# Patient Record
Sex: MALE | Race: WHITE | URBAN - METROPOLITAN AREA
[De-identification: names, ages, dates, MRNs, and addresses within clinical notes are randomized per-mention and may not be internally consistent; named-entity substitution may affect disease eponyms.]

---

## 2019-07-09 ASSESSMENT — ANXIETY QUESTIONNAIRES
7. FEELING AFRAID AS IF SOMETHING AWFUL MIGHT HAPPEN: NOT AT ALL
7. FEELING AFRAID AS IF SOMETHING AWFUL MIGHT HAPPEN: NOT AT ALL
GAD7 TOTAL SCORE: 0
2. NOT BEING ABLE TO STOP OR CONTROL WORRYING: NOT AT ALL
7. FEELING AFRAID AS IF SOMETHING AWFUL MIGHT HAPPEN: NOT AT ALL
2. NOT BEING ABLE TO STOP OR CONTROL WORRYING: NOT AT ALL
4. TROUBLE RELAXING: NOT AT ALL
GAD7 TOTAL SCORE: 0
GAD7 TOTAL SCORE: 0
6. BECOMING EASILY ANNOYED OR IRRITABLE: NOT AT ALL
1. FEELING NERVOUS, ANXIOUS, OR ON EDGE: NOT AT ALL
5. BEING SO RESTLESS THAT IT IS HARD TO SIT STILL: NOT AT ALL
5. BEING SO RESTLESS THAT IT IS HARD TO SIT STILL: NOT AT ALL
GAD7 TOTAL SCORE: 0
3. WORRYING TOO MUCH ABOUT DIFFERENT THINGS: NOT AT ALL
GAD7 TOTAL SCORE: 0
6. BECOMING EASILY ANNOYED OR IRRITABLE: NOT AT ALL
GAD7 TOTAL SCORE: 0
7. FEELING AFRAID AS IF SOMETHING AWFUL MIGHT HAPPEN: NOT AT ALL
3. WORRYING TOO MUCH ABOUT DIFFERENT THINGS: NOT AT ALL
4. TROUBLE RELAXING: NOT AT ALL
1. FEELING NERVOUS, ANXIOUS, OR ON EDGE: NOT AT ALL

## 2019-07-09 ASSESSMENT — PATIENT HEALTH QUESTIONNAIRE - PHQ9
SUM OF ALL RESPONSES TO PHQ QUESTIONS 1-9: 0

## 2019-07-10 ENCOUNTER — VIRTUAL VISIT (OUTPATIENT)
Dept: INTERNAL MEDICINE | Facility: CLINIC | Age: 49
End: 2019-07-10
Payer: COMMERCIAL

## 2019-07-10 DIAGNOSIS — Z00.00 ENCOUNTER FOR PREVENTIVE HEALTH EXAMINATION: Primary | ICD-10-CM

## 2019-07-10 RX ORDER — ZOLPIDEM TARTRATE 5 MG/1
1 TABLET ORAL PRN
COMMUNITY
Start: 2017-09-28

## 2019-07-10 RX ORDER — DIPHENOXYLATE HYDROCHLORIDE AND ATROPINE SULFATE 2.5; .025 MG/1; MG/1
1 TABLET ORAL DAILY
COMMUNITY
Start: 2016-08-11

## 2019-07-10 RX ORDER — POLYETHYLENE GLYCOL 3350 17 G/17G
1 POWDER, FOR SOLUTION ORAL PRN
COMMUNITY

## 2019-07-10 ASSESSMENT — PATIENT HEALTH QUESTIONNAIRE - PHQ9
SUM OF ALL RESPONSES TO PHQ QUESTIONS 1-9: 0
SUM OF ALL RESPONSES TO PHQ QUESTIONS 1-9: 0

## 2019-07-10 ASSESSMENT — ANXIETY QUESTIONNAIRES
GAD7 TOTAL SCORE: 0
GAD7 TOTAL SCORE: 0

## 2019-07-10 NOTE — PROGRESS NOTES
Health Maintenance:  Do you have a PCP? No  When was your last visit with your PCP?   When was your last eye exam? 1.5 years  Have you ever had a colonoscopy? No  If yes, when?   Have you ever had any polyps removed? No    30+ Pack Year Smoking History:  Have you ever had a chest CT to screen for cancer? No    If Male:  (65 years old+ and tobacco use) Have you ever completed an ultrasound of your abdominal aorta? No    As part of your visit we will set up a DEXA scan which will measure your body composition. We have a few questions that need to be answered before we can schedule this scan:   What is your approximate weight? 215   Have you ever had a DEXA scan within the past 2 years? No   Will you have any other imaging studies with contrast (x-ray, CT scan) within 7  days of this appointment? No   Have you had any spine or hip surgery? No   Do you take any vitamins that contain calcium or antacids with calcium? Yes    If yes, stop taking 24 hours prior to visit.     Goals for the Visit:  1. Thorough Comprehensive Preventative Exam  2. Derm: Mole check    Pertinent past Medical/Family and Social HX: Father had prostate cancer  Pertinent sx that desire are addressed with this visit:       Answers for HPI/ROS submitted by the patient on 7/9/2019   PHQ9 TOTAL SCORE: 0  JOSI 7 TOTAL SCORE: 0  General Symptoms: No  Skin Symptoms: No  HENT Symptoms: No  EYE SYMPTOMS: No  HEART SYMPTOMS: No  LUNG SYMPTOMS: No  INTESTINAL SYMPTOMS: No  URINARY SYMPTOMS: No  REPRODUCTIVE SYMPTOMS: No  SKELETAL SYMPTOMS: No  BLOOD SYMPTOMS: No  NERVOUS SYSTEM SYMPTOMS: No  MENTAL HEALTH SYMPTOMS: No    Instructions prior to appointment:   1. Fast beginning at 10 pm for lab appointment  2. If your preventive care assessment package includes a Fitness Assessment, please bring athletic shoes. Complementary [facility] fitness attire is provided and yours to keep.  3. If eye exam, eyes may be dilated, it will last 4-6 hours, may  want to bring sunglasses.   4. May bring laptop or other work materials for use during downtime.   5. You will receive an email about 3 days prior to your visit with a final itinerary, menu selections for the complementary breakfast and lunch and instructions for the visit.     Complimentary  Parking provided. Drop off car in front of [facility], take the patient elevators to the [facility]. When you enter in the lobby, identify yourself as an [facility] [atient and you will be escorted up to the clinic.   If questions arise prior to your appointment please contact the clinic at [phone].

## 2019-07-15 ENCOUNTER — APPOINTMENT (OUTPATIENT)
Dept: INTERNAL MEDICINE | Facility: CLINIC | Age: 49
End: 2019-07-15
Payer: COMMERCIAL

## 2019-07-15 ENCOUNTER — ANCILLARY PROCEDURE (OUTPATIENT)
Dept: BONE DENSITY | Facility: CLINIC | Age: 49
End: 2019-07-15
Payer: COMMERCIAL

## 2019-07-15 ENCOUNTER — OFFICE VISIT (OUTPATIENT)
Dept: OPHTHALMOLOGY | Facility: CLINIC | Age: 49
End: 2019-07-15
Payer: COMMERCIAL

## 2019-07-15 ENCOUNTER — OFFICE VISIT (OUTPATIENT)
Dept: INTERNAL MEDICINE | Facility: CLINIC | Age: 49
End: 2019-07-15
Payer: COMMERCIAL

## 2019-07-15 ENCOUNTER — OFFICE VISIT (OUTPATIENT)
Dept: AUDIOLOGY | Facility: CLINIC | Age: 49
End: 2019-07-15
Payer: COMMERCIAL

## 2019-07-15 ENCOUNTER — OFFICE VISIT (OUTPATIENT)
Dept: DERMATOLOGY | Facility: CLINIC | Age: 49
End: 2019-07-15
Payer: COMMERCIAL

## 2019-07-15 VITALS
HEART RATE: 64 BPM | WEIGHT: 216 LBS | OXYGEN SATURATION: 99 % | TEMPERATURE: 97.6 F | DIASTOLIC BLOOD PRESSURE: 79 MMHG | RESPIRATION RATE: 16 BRPM | SYSTOLIC BLOOD PRESSURE: 113 MMHG | HEIGHT: 73 IN | BODY MASS INDEX: 28.63 KG/M2

## 2019-07-15 DIAGNOSIS — Z00.00 ROUTINE GENERAL MEDICAL EXAMINATION AT A HEALTH CARE FACILITY: Primary | ICD-10-CM

## 2019-07-15 DIAGNOSIS — H90.3 ASYMMETRICAL SENSORINEURAL HEARING LOSS: ICD-10-CM

## 2019-07-15 DIAGNOSIS — Z00.00 ENCOUNTER FOR PREVENTIVE HEALTH EXAMINATION: Primary | ICD-10-CM

## 2019-07-15 DIAGNOSIS — D18.01 CHERRY ANGIOMA: Primary | ICD-10-CM

## 2019-07-15 DIAGNOSIS — Z87.19 HISTORY OF RECTAL BLEEDING: ICD-10-CM

## 2019-07-15 DIAGNOSIS — H52.10: Primary | ICD-10-CM

## 2019-07-15 DIAGNOSIS — H90.3 SENSORINEURAL HEARING LOSS, ASYMMETRICAL: Primary | ICD-10-CM

## 2019-07-15 DIAGNOSIS — Z90.89 STATUS POST TONSILLECTOMY: ICD-10-CM

## 2019-07-15 DIAGNOSIS — Z00.00 ENCOUNTER FOR PREVENTIVE HEALTH EXAMINATION: ICD-10-CM

## 2019-07-15 DIAGNOSIS — Z87.448 HISTORY OF HEMATURIA: ICD-10-CM

## 2019-07-15 DIAGNOSIS — D22.5 MELANOCYTIC NEVUS OF TRUNK: ICD-10-CM

## 2019-07-15 DIAGNOSIS — Z86.39 HISTORY OF VITAMIN D DEFICIENCY: ICD-10-CM

## 2019-07-15 DIAGNOSIS — Z87.81 HISTORY OF WRIST FRACTURE: ICD-10-CM

## 2019-07-15 LAB
ALBUMIN UR-MCNC: NEGATIVE MG/DL
ALP SERPL-CCNC: 76 U/L (ref 40–150)
ALT SERPL W P-5'-P-CCNC: 29 U/L (ref 0–70)
APPEARANCE UR: CLEAR
BASOPHILS # BLD AUTO: 0.1 10E9/L (ref 0–0.2)
BASOPHILS NFR BLD AUTO: 1 %
BILIRUB UR QL STRIP: NEGATIVE
CHOLEST SERPL-MCNC: 169 MG/DL
COLOR UR AUTO: YELLOW
CREAT SERPL-MCNC: 1.11 MG/DL (ref 0.66–1.25)
DIFFERENTIAL METHOD BLD: NORMAL
EOSINOPHIL # BLD AUTO: 0.1 10E9/L (ref 0–0.7)
EOSINOPHIL NFR BLD AUTO: 1 %
ERYTHROCYTE [DISTWIDTH] IN BLOOD BY AUTOMATED COUNT: 12.3 % (ref 10–15)
GFR SERPL CREATININE-BSD FRML MDRD: 77 ML/MIN/{1.73_M2}
GLUCOSE SERPL-MCNC: 89 MG/DL (ref 70–99)
GLUCOSE UR STRIP-MCNC: NEGATIVE MG/DL
HCT VFR BLD AUTO: 45.6 % (ref 40–53)
HDLC SERPL-MCNC: 55 MG/DL
HGB BLD-MCNC: 14.6 G/DL (ref 13.3–17.7)
HGB UR QL STRIP: ABNORMAL
KETONES UR STRIP-MCNC: NEGATIVE MG/DL
LDLC SERPL CALC-MCNC: 96 MG/DL
LEUKOCYTE ESTERASE UR QL STRIP: NEGATIVE
LYMPHOCYTES # BLD AUTO: 2.2 10E9/L (ref 0.8–5.3)
LYMPHOCYTES NFR BLD AUTO: 42 %
MCH RBC QN AUTO: 30.3 PG (ref 26.5–33)
MCHC RBC AUTO-ENTMCNC: 32 G/DL (ref 31.5–36.5)
MCV RBC AUTO: 95 FL (ref 78–100)
MONOCYTES # BLD AUTO: 0.5 10E9/L (ref 0–1.3)
MONOCYTES NFR BLD AUTO: 9 %
NEUTROPHILS # BLD AUTO: 2.4 10E9/L (ref 1.6–8.3)
NEUTROPHILS NFR BLD AUTO: 47 %
NITRATE UR QL: NEGATIVE
NONHDLC SERPL-MCNC: 114 MG/DL
PH UR STRIP: 5 PH (ref 5–7)
PLATELET # BLD AUTO: 221 10E9/L (ref 150–450)
PSA SERPL-ACNC: 0.6 UG/L (ref 0–4)
RBC # BLD AUTO: 4.82 10E12/L (ref 4.4–5.9)
RBC #/AREA URNS AUTO: <1 /HPF (ref 0–2)
SOURCE: ABNORMAL
SP GR UR STRIP: 1.02 (ref 1–1.03)
TRIGL SERPL-MCNC: 89 MG/DL
TSH SERPL DL<=0.005 MIU/L-ACNC: 1.04 MU/L (ref 0.4–4)
UROBILINOGEN UR STRIP-MCNC: 0 MG/DL (ref 0–2)
WBC # BLD AUTO: 5.3 10E9/L (ref 4–11)
WBC #/AREA URNS AUTO: <1 /HPF (ref 0–5)

## 2019-07-15 ASSESSMENT — REFRACTION_MANIFEST
OD_CYLINDER: +0.25
OD_ADD: +1.50
OS_CYLINDER: +0.50
OS_AXIS: 140
OS_ADD: +1.50
OD_AXIS: 014
OD_SPHERE: -1.00
OS_SPHERE: -1.00

## 2019-07-15 ASSESSMENT — CONF VISUAL FIELD
METHOD: COUNTING FINGERS
OD_NORMAL: 1
OS_NORMAL: 1

## 2019-07-15 ASSESSMENT — TONOMETRY
IOP_METHOD: ICARE
OD_IOP_MMHG: 9
OS_IOP_MMHG: 8

## 2019-07-15 ASSESSMENT — VISUAL ACUITY
METHOD: SNELLEN - LINEAR
OS_SC: 20/30
OD_SC: 20/25
OS_SC+: -1

## 2019-07-15 ASSESSMENT — EXTERNAL EXAM - LEFT EYE: OS_EXAM: NORMAL

## 2019-07-15 ASSESSMENT — CUP TO DISC RATIO
OD_RATIO: 0.2
OS_RATIO: 0.2

## 2019-07-15 ASSESSMENT — MIFFLIN-ST. JEOR: SCORE: 1907.61

## 2019-07-15 ASSESSMENT — SLIT LAMP EXAM - LIDS
COMMENTS: NORMAL
COMMENTS: NORMAL

## 2019-07-15 ASSESSMENT — PAIN SCALES - GENERAL
PAINLEVEL: NO PAIN (0)
PAINLEVEL: NO PAIN (0)

## 2019-07-15 ASSESSMENT — EXTERNAL EXAM - RIGHT EYE: OD_EXAM: NORMAL

## 2019-07-15 NOTE — LETTER
"7/15/2019       RE: Zhang Fajardo  [address]  [place] [address]     Dear Colleague,    Thank you for referring your patient, Zhang Fajardo, to the [facility] at [facility]. Please see a copy of my visit note below.     History and Physical Examination     SUBJECTIVE: Chief complaint: preventive health review.     Past Medical History:  1.  Status post tonsillectomy  2.  Status post excision of unspecified \"precancerous\" skin lesion, right axilla  3.  Status post bilateral traumatic wrist fractures, circa 1980  4.  History of microscopic hematuria with 2016 unremarkable evaluation including CT urogram  5.  History of atrophic right kidney, left kidney hypertrophy, and \"irregular\" bladder, deemed benign and without need for further evaluation based on 9/2017 CT  6.  History of right upper chest \"scarring\", on CT to evaluate possible nodules, 9/2017  7.  History of rectal bleeding; colonoscopy recommended but not completed following identification of hemorrhoids  8.  History of vitamin D deficiency     Adverse Drug Reactions: Amoxicillin (urticaria)     Current Medications:  Daily multivitamin supplement  Zolpidem, 5 g daily at bedtime as needed for international travel-related insomnia  Polyethylene glycol, backspace (MiraLAX), 17 gm daily as needed; used infrequently     Habits:  Tobacco: Never  Alcohol: Never  Caffeine: 2 servings of coffee per day     Social History:  to Patience and father 3 daughters: Linda, age 18, who will begin her freshman year at [employer] this fall; eZna, age 16, who will begin her ty year of high school this fall; and Jennifer, age 12, who will begin seventh grade this fall.  Jose is a native of [place], [place] who received his undergraduate and HARISH degrees from the [employer].  He has served as the  of [employer] over the past year, having previously worked for many years at " [employer].  Away from work, he enjoys family activities, downhill skiing at [place], and golf.     Family History: Mother is 73, with history of dyslipidemia.  Father is 75, with history prostate cancer, diagnosed at age 70.  A sister 2 years his senior is overweight.  A sister 9 years his ty is in good health.  Daughters are in good health.  Maternal grandfather  in his mid 60s after suffering a myocardial infarction at age 60.  Maternal grandmother  in her mid 60s, with history of depression and possible heart disease.  Paternal grandfather  from cancer, possibly stomach, in his mid-80s.  Paternal grandmother was a nonsmoker who  from  lung cancer at age 70.     Review of Systems: No history of colonoscopy.  Most recent tetanus (Tdap) was administered in 2013.  Hepatitis A and B vaccination series completed in 2013.  M MR administered in 2014.  Typhoid IM administered in 2018.  Remainder of complete review of systems was negative.     OBJECTIVE:     Vital signs: Reviewed in patient health profile.  General: Alert, neatly dressed and groomed, in no acute distress.  HEENT: Atraumatic and normocephalic. Eyelids, pupils, and conjunctivae appeared normal. Lips, teeth and gums appear normal.  Oropharynx showed moist mucous membranes, without exudate or erythema.  Neck: Supple, without thyromegaly, mass, or bruit. No cervical or supraclavicular lymphadenopathy.  Back: No spinal or costovertebral angle tenderness.  Chest: Clear to auscultation and percussion. Normal respiratory effort.  Cardiovascular: No jugular venous distention. Regular rate and rhythm, normal S1, S2 without murmur.  Abdomen: Bowel sounds positive; soft, nontender, without rebound, guarding, hepatosplenomegaly or mass.  Extremities: No cyanosis or edema.  Genitalia: Normal male genitalia, without scrotal mass or hernia. No inguinal lymphadenopathy.  Rectal: Normal tone, with smooth, nontender, nonenlarged  "prostate. No rectal mass.  Skin: Examination was deferred; full evaluation was completed earlier in day through dermatology clinic.  Neurologic: Cranial nerves II-XII were grossly intact. Sensory and motor examinations were normal. Normal gait.  Mini-Cog score was 4/5; 5/5 with minimal prompting.  Psychiatric: Alert and oriented ×3. Normal affect. Judgment and insight intact.  JOSI-7 and PHQ-9 scores were 0.    Creatinine 1.71, alkaline phosphatase 76, ALT 29, cholesterol 169, HDL 55, LDL 96, triglycerides 89, cholesterol/HDL 3.0, PSA 0.60, TSH 1.04, glucose 89, white blood cell count 5300, hemoglobin 14.6, platelets 221,000, urinalysis notable for both \"small\" blood, with <1 rbc/hpf.  HIV and 25-hydroxy vitamin D levels pending.    EKG was unremarkable.  Spirometry showed an FEV1 of 5.00, with an FVC of 5.82; readings were 114% and 104% of predicted values, respectively.    DEXA showed normal bone density, with most negative and valid Z-score at L1-L4 composite.  Body composition analysis showed 38.4% fat (100th percentile); body mass index was 28.5.     ASSESSMENT:    1.  History of rectal bleeding.  No active symptoms.  I recommended colonoscopy for further evaluation.    2.  Increased body fat composition.  Guidelines from the AHA/ACC estimates his 10-year risk of a vascular event at 1.6% (optimal for his age/gender cohort is 1.7%).  Nevertheless, given his 100th percentile reading of 38.4% fat, I recommended weight loss, with additional strength training to build muscle mass.  We reviewed strategies to facilitate weight loss and a plan for integrating additional physical activity and exercise into his busy schedule.    3.  Asymmetric hearing loss.  ENT consultation recommended for evaluation of 25 dB high-frequency hearing loss discrepancy, as advised by audiologist.    4.  Preventive care.  We reviewed the debate regarding the benefit of multivitamin supplements and the importance of selecting a product that " "does not contain iron should he choose to continue.  I recommended daily use of a 2000-international unit vitamin D3 supplement, while maintaining daily calcium intake of 1000 mg, preferably from dietary sources.  We reviewed strategies to reduce weight, including the importance of \"mindful\", slow eating, use of small plates, adequate hydration, appealing food presentation, and healthful snacking.  Immunization status is up-to-date.    PLAN: See above.     ~SRT      Again, thank you for allowing me to participate in the care of your patient.      Sincerely,    Quinten Gill MD      "

## 2019-07-15 NOTE — PROGRESS NOTES
Zhang JACQUES Fajardo comes into clinic today at the request of Dr. MARCELA Gill Ordering Provider for EKG.    This service provided today was under the supervising provider of the day Dr. MARCELA Gill, who was available if needed.    Tegan Hardin

## 2019-07-15 NOTE — PROGRESS NOTES
[facility] Dermatology Note      Dermatology Problem List:    Specialty Problems     None          CC:   Skin Check (Javier is here for skin check mole on his back )        Encounter Date: Jul 15, 2019    History of Present Illness:  Mr. Zhang Fajardo is a 49 year old male who presents as a referral from Self.    Past Medical History:   There is no problem list on file for this patient.    No past medical history on file.    Allergy History:     Allergies   Allergen Reactions     Amoxicillin      PN: LW Reaction: HIVES       Social History:     reports that he has never smoked. He has never used smokeless tobacco. He reports that he does not drink alcohol or use drugs.      Family History:  Family History   Problem Relation Age of Onset     Cancer Paternal Grandfather      Cancer Paternal Grandmother         lung     Cancer Father         prostate     Myocardial Infarction Maternal Grandfather      Hyperlipidemia Mother        Medications:  Current Outpatient Medications   Medication Sig Dispense Refill     Multiple Vitamin (MULTI-VITAMINS) TABS Take 1 tablet by mouth daily       polyethylene glycol (MIRALAX/GLYCOLAX) packet Take 1 packet by mouth as needed for constipation       zolpidem (AMBIEN) 5 MG tablet Take 1 tablet by mouth as needed             Review of Systems:  -As per HPI  -Constitutional: The patient denies fatigue, fevers, chills, unintended weight loss, and night sweats.  -HEENT: Patient denies nonhealing oral sores.  -Skin: As above in HPI. No additional skin concerns.    Physical exam:  Vitals: There were no vitals taken for this visit.  GEN: This is a well developed, well-nourished male in no acute distress, in a pleasant mood.    SKIN: Full skin, which includes the head/face, both arms, chest, back, abdomen,both legs, genitalia and/or groin buttocks, digits and/or nails, was examined.  On trunk several cherry angiomas and several light brown, exophytic dermal naevi of about  4mm diameter.   On Dig I left foot a verruca plantaris  No signs for skin cancers    -No other lesions of concern on areas examined.     Impression/Plan:    >> cherry angiomas and dermal naevi    Sun protection and observation      Follow-up in 1-2 years    I spent a total of 10 minutes face to face with Zhang Fajardo during today s office visit. Over 50% of this time was spent counseling the patient and/or coordinating care.  Please see Assessment and Plan for details.

## 2019-07-15 NOTE — Clinical Note
Hi Dr. Gill,This is one of the [facility] health patients today- there is a significant ear difference at one frequency. I spoke with the patient about an ENT appt, or at the VERY least an appt in one year to monitor hearing status.Let me know if you have questions.  Thanks!Gillian Wick

## 2019-07-15 NOTE — NURSING NOTE
Chief Complaints and History of Present Illnesses   Patient presents with     COMPREHENSIVE EYE EXAM     Chief Complaint(s) and History of Present Illness(es)     COMPREHENSIVE EYE EXAM     Laterality: both eyes    Frequency: constantly    Course: gradually worsening    Treatments tried: no treatments    Pain scale: 0/10              Comments     Annual eye exam, last exam 1.5 years ago. Pt feels vision has been getting harder to focus up close but still doing fine. H/o Lasik. No pain. No drops.    Vickie Villareal COT 7:57 AM July 15, 2019

## 2019-07-15 NOTE — LETTER
"7/15/2019      RE: Zhang HANNA Tana  [address] [place] [address]        History and Physical Examination     SUBJECTIVE: Chief complaint: preventive health review.     Past Medical History:  1.  Status post tonsillectomy  2.  Status post excision of unspecified \"precancerous\" skin lesion, right axilla  3.  Status post bilateral traumatic wrist fractures, circa 1980  4.  History of microscopic hematuria with 2016 unremarkable evaluation including CT urogram  5.  History of atrophic right kidney, left kidney hypertrophy, and \"irregular\" bladder, deemed benign and without need for further evaluation based on 9/2017 CT  6.  History of right upper chest \"scarring\", on CT to evaluate possible nodules, 9/2017  7.  History of rectal bleeding; colonoscopy recommended but not completed following identification of hemorrhoids  8.  History of vitamin D deficiency     Adverse Drug Reactions: Amoxicillin (urticaria)     Current Medications:  Daily multivitamin supplement  Zolpidem, 5 g daily at bedtime as needed for international travel-related insomnia  Polyethylene glycol, backspace (MiraLAX), 17 gm daily as needed; used infrequently     Habits:  Tobacco: Never  Alcohol: Never  Caffeine: 2 servings of coffee per day     Social History:  to Patience and father 3 daughters: Linda, age 18, who will begin her freshman year at [employer] this fall; Zena, age 16, who will begin her ty year of high school this fall; and Jennifer, age 12, who will begin seventh grade this fall.  Jose is a native of [place], [place] who received his undergraduate and HARISH degrees from the [employer].  He has served as the  of [employer] over the past year, having previously worked for many years at [employer].  Away from work, he enjoys family activities, downhill skiing at [place], and golf.     Family History: Mother is 73, with history of dyslipidemia.  Father is 75, with history prostate " cancer, diagnosed at age 70.  A sister 2 years his senior is overweight.  A sister 9 years his ty is in good health.  Daughters are in good health.  Maternal grandfather  in his mid 60s after suffering a myocardial infarction at age 60.  Maternal grandmother  in her mid 60s, with history of depression and possible heart disease.  Paternal grandfather  from cancer, possibly stomach, in his mid-80s.  Paternal grandmother was a nonsmoker who  from  lung cancer at age 70.     Review of Systems: No history of colonoscopy.  Most recent tetanus (Tdap) was administered in 2013.  Hepatitis A and B vaccination series completed in 2013.  M MR administered in 2014.  Typhoid IM administered in 2018.  Remainder of complete review of systems was negative.     OBJECTIVE:     Vital signs: Reviewed in patient health profile.  General: Alert, neatly dressed and groomed, in no acute distress.  HEENT: Atraumatic and normocephalic. Eyelids, pupils, and conjunctivae appeared normal. Lips, teeth and gums appear normal.  Oropharynx showed moist mucous membranes, without exudate or erythema.  Neck: Supple, without thyromegaly, mass, or bruit. No cervical or supraclavicular lymphadenopathy.  Back: No spinal or costovertebral angle tenderness.  Chest: Clear to auscultation and percussion. Normal respiratory effort.  Cardiovascular: No jugular venous distention. Regular rate and rhythm, normal S1, S2 without murmur.  Abdomen: Bowel sounds positive; soft, nontender, without rebound, guarding, hepatosplenomegaly or mass.  Extremities: No cyanosis or edema.  Genitalia: Normal male genitalia, without scrotal mass or hernia. No inguinal lymphadenopathy.  Rectal: Normal tone, with smooth, nontender, nonenlarged prostate. No rectal mass.  Skin: Examination was deferred; full evaluation was completed earlier in day through dermatology clinic.  Neurologic: Cranial nerves II-XII were grossly intact. Sensory and motor  "examinations were normal. Normal gait.  Mini-Cog score was 4/5; 5/5 with minimal prompting.  Psychiatric: Alert and oriented ×3. Normal affect. Judgment and insight intact.  JOSI-7 and PHQ-9 scores were 0.    Creatinine 1.71, alkaline phosphatase 76, ALT 29, cholesterol 169, HDL 55, LDL 96, triglycerides 89, cholesterol/HDL 3.0, PSA 0.60, TSH 1.04, glucose 89, white blood cell count 5300, hemoglobin 14.6, platelets 221,000, urinalysis notable for both \"small\" blood, with <1 rbc/hpf.  HIV and 25-hydroxy vitamin D levels pending.    EKG was unremarkable.  Spirometry showed an FEV1 of 5.00, with an FVC of 5.82; readings were 114% and 104% of predicted values, respectively.    DEXA showed normal bone density, with most negative and valid Z-score at L1-L4 composite.  Body composition analysis showed 38.4% fat (100th percentile); body mass index was 28.5.     ASSESSMENT:    1.  History of rectal bleeding.  No active symptoms.  I recommended colonoscopy for further evaluation.    2.  Increased body fat composition.  Guidelines from the AHA/ACC estimates his 10-year risk of a vascular event at 1.6% (optimal for his age/gender cohort is 1.7%).  Nevertheless, given his 100th percentile reading of 38.4% fat, I recommended weight loss, with additional strength training to build muscle mass.  We reviewed strategies to facilitate weight loss and a plan for integrating additional physical activity and exercise into his busy schedule.    3.  Asymmetric hearing loss.  ENT consultation recommended for evaluation of 25 dB high-frequency hearing loss discrepancy, as advised by audiologist.    4.  Preventive care.  We reviewed the debate regarding the benefit of multivitamin supplements and the importance of selecting a product that does not contain iron should he choose to continue.  I recommended daily use of a 2000-international unit vitamin D3 supplement, while maintaining daily calcium intake of 1000 mg, preferably from dietary " "sources.  We reviewed strategies to reduce weight, including the importance of \"mindful\", slow eating, use of small plates, adequate hydration, appealing food presentation, and healthful snacking.  Immunization status is up-to-date.    PLAN: See above.     ~SRT      Quinten Gill MD      "

## 2019-07-15 NOTE — PROGRESS NOTES
AUDIOLOGY REPORT  [facility]    SUMMARY: Audiology visit completed. See audiogram for results.      RECOMMENDATIONS: Follow-up with Dr. Gill. Consider follow-up with ENT regarding the significant ear difference at 8 kHz. Repeat hearing evaluation every 1-2 years, sooner if concerns arise.      Rome Lee  Audiologist  [place] License  #[id number]

## 2019-07-15 NOTE — LETTER
Date:July 16, 2019      Patient was self referred, no letter generated. Do not send.        [facility] Health Information

## 2019-07-15 NOTE — PROGRESS NOTES
Chief Complaints and History of Present Illnesses   Patient presents with     COMPREHENSIVE EYE EXAM     Chief Complaint(s) and History of Present Illness(es)     COMPREHENSIVE EYE EXAM     In both eyes.  Occurring constantly.  Since onset it is gradually   worsening.  Treatments tried include no treatments.  Pain was noted as   0/10.              Comments     Annual eye exam, last exam 1.5 years ago. Pt feels vision has been   getting harder to focus up close but still doing fine. H/o Lasik. No pain.   No drops.    Vickie Villareal COT 7:57 AM July 15, 2019                      Assessment & Plan     Zhang Fajardo is a 49 year old male with the following diagnoses:   1. Error, refractive, myopia, unspecified laterality         Manifest refraction = rX             Attending Physician Attestation:  Complete documentation of historical and exam elements from today's encounter can be found in the full encounter summary report (not reduplicated in this progress note).  I personally obtained the chief complaint(s) and history of present illness.  I confirmed and edited as necessary the review of systems, past medical/surgical history, family history, social history, and examination findings as documented by others; and I examined the patient myself.  I personally reviewed the relevant tests, images, and reports as documented above.  I formulated and edited as necessary the assessment and plan and discussed the findings and management plan with the patient and family. I personally reviewed the ophthalmic test(s) associated with this encounter, agree with the interpretation(s) as documented by the resident/fellow, and have edited the corresponding report(s) as necessary.   -Mahad Desai MD  8:36 AM 7/15/2019

## 2019-07-15 NOTE — PROGRESS NOTES
" History and Physical Examination     SUBJECTIVE: Chief complaint: preventive health review.     Past Medical History:  1.  Status post tonsillectomy  2.  Status post excision of unspecified \"precancerous\" skin lesion, right axilla  3.  Status post bilateral traumatic wrist fractures, circa 1980  4.  History of microscopic hematuria with 2016 unremarkable evaluation including CT urogram  5.  History of atrophic right kidney, left kidney hypertrophy, and \"irregular\" bladder, deemed benign and without need for further evaluation based on 9/2017 CT  6.  History of right upper chest \"scarring\", on CT to evaluate possible nodules, 9/2017  7.  History of rectal bleeding; colonoscopy recommended but not completed following identification of hemorrhoids  8.  History of vitamin D deficiency     Adverse Drug Reactions: Amoxicillin (urticaria)     Current Medications:  Daily multivitamin supplement  Zolpidem, 5 mg daily at bedtime as needed for international travel-related insomnia  Polyethylene glycol (MiraLAX), 17 gm daily as needed; used infrequently     Habits:  Tobacco: Never  Alcohol: Never  Caffeine: 2 servings of coffee per day     Social History:  to Patience and father of 3 daughters: Linda, age 18, who will begin her freshman year at [employer] this fall; Zena, age 16, who will begin her ty year of high school this fall; and Kandi, age 12, who will begin seventh grade this fall.  Javier is a native of [place], [place] who received his undergraduate and HARISH degrees from the [employer].  He has served as the  of [employer] over the past year, having previously worked for many years at [employer].  Away from work, he enjoys family activities, downhill skiing in [place], and golf.     Family History: Mother is 73, with history of dyslipidemia.  Father is 75, with history prostate cancer, diagnosed at age 70.  A sister 2 years his senior is overweight.  A sister " 9 years his ty is in good health.  Daughters are in good health.  Maternal grandfather  in his mid-60s after suffering a myocardial infarction at age 60.  Maternal grandmother  in her mid-60s, with history of depression and possible heart disease.  Paternal grandfather  from cancer, possibly stomach, in his mid-80s.  Paternal grandmother was a nonsmoker who  from  lung cancer at age 70.     Review of Systems:  History of rectal bleeding, for which colonoscopy was recommended but not completed after subsequent attribution to hemorrhoids.  Most recent tetanus (Tdap) was administered in 2013.  Hepatitis A and B vaccination series completed in 2013.  MMR administered in 2014.  Typhoid IM administered in 2018.  Remainder of complete review of systems was negative.     OBJECTIVE:     Vital signs: Height 73.25 inches. Weight 216 pounds. Blood pressure 114/79 on average of 3 automated readings.  Heart rate 64.  Respiratory rate 16.  Temperature 97.6 degrees.  O2 saturation 99% on room air.  General: Alert, neatly dressed and groomed, in no acute distress.  HEENT: Atraumatic and normocephalic. Eyelids, pupils, and conjunctivae appeared normal. Lips, teeth and gums appear normal.  Oropharynx showed moist mucous membranes, without exudate or erythema.  Neck: Supple, without thyromegaly, mass, or bruit. No cervical or supraclavicular lymphadenopathy.  Back: No spinal or costovertebral angle tenderness.  Chest: Clear to auscultation and percussion. Normal respiratory effort.  Cardiovascular: No jugular venous distention. Regular rate and rhythm, normal S1, S2 without murmur.  Abdomen: Bowel sounds positive; soft, nontender, without rebound, guarding, hepatosplenomegaly or mass.  Extremities: No cyanosis or edema.  Genitalia: Normal male genitalia, without scrotal mass or hernia. No inguinal lymphadenopathy.  Rectal: Normal tone, with smooth, nontender, nonenlarged prostate. No rectal mass.  Skin:  "Examination was deferred; full evaluation was completed earlier in day through dermatology clinic.  Neurologic: Cranial nerves II-XII were grossly intact. Sensory and motor examinations were normal. Normal gait.  Mini-Cog score was 4/5; 5/5 with minimal prompting.  Psychiatric: Alert and oriented ×3. Normal affect. Judgment and insight intact.  JOSI-7 and PHQ-9 scores were 0.    Creatinine 1.71, alkaline phosphatase 76, ALT 29, cholesterol 169, HDL 55, LDL 96, triglycerides 89, cholesterol/HDL 3.0, PSA 0.60, TSH 1.04, glucose 89, white blood cell count 5300, hemoglobin 14.6, platelets 221,000, urinalysis notable for both \"small\" blood, with <1 rbc/hpf;  HIV non-reactive, 25-hydroxy vitamin D 27.    EKG was unremarkable.  Spirometry showed an FEV1 of 5.00, with an FVC of 5.82; readings were 114% and 104% of predicted values, respectively.    DEXA showed normal bone density, with most negative and valid Z-score at L1-L4 composite.  Body composition analysis showed 38.4% fat (100th percentile); body mass index was 28.5.     ASSESSMENT:    1.  History of rectal bleeding.  No active symptoms.  I recommended colonoscopy for further evaluation.    2.  Increased body fat composition.  Guidelines from the AHA/ACC estimates his 10-year risk of a vascular event at 1.6% (optimal for his age/gender cohort is 1.7%).  Nevertheless, given his 100th percentile reading of 38.4% fat, I recommended weight loss, with additional strength training to build muscle mass.  We reviewed strategies to facilitate weight loss and a plan for integrating additional physical activity and exercise into his busy schedule.    3.  Asymmetric hearing loss.  ENT consultation recommended for evaluation of 25 dB high-frequency hearing loss discrepancy, as advised by audiologist.    4.  History of Vitamin D deficiency.  Current reading is at the lower end of normal range.  He was advised to use a 2000-international unit vitamin D3 supplement daily.     5.  " "Preventive care.  We reviewed the debate regarding the benefit of multivitamin supplements and the importance of selecting a product that does not contain iron should he choose to continue.  I recommended daily use of a 2000-international unit vitamin D3 supplement, while maintaining daily calcium intake of 1000 mg, preferably from dietary sources.  We reviewed strategies to reduce weight, including the importance of \"mindful\", slow eating, use of small plates, adequate hydration, appealing food presentation, and healthful snacking.  Immunization status is up-to-date.    PLAN: See above.     ~SRT    "

## 2019-07-15 NOTE — LETTER
Date:July 16, 2019      Patient was self referred, no letter generated. Do not send.        [facility] Information

## 2019-07-15 NOTE — NURSING NOTE
Chief Complaint   Patient presents with     Physical     Patient is here for annual physical     Tegan Hardin CMA 7:24 AM on 7/15/2019.

## 2019-07-15 NOTE — NURSING NOTE
AHA BP    1st   116/80  2nd  112/79  3rd   113/79    Average   114/79  Tegan Hardin [facility] 7:41 AM on 7/15/2019.

## 2019-07-15 NOTE — LETTER
7/15/2019       RE: Zhang Fajardo  [address] [place] [address]     Dear Colleague,    Thank you for referring your patient, Zhang Fajardo, to the [facility] at [facility]. Please see a copy of my visit note below.    [facility] Dermatology Note      Dermatology Problem List:    Specialty Problems     None          CC:   Skin Check (Javier is here for skin check mole on his back )        Encounter Date: Jul 15, 2019    History of Present Illness:  Mr. Zhang Fajardo is a 49 year old male who presents as a referral from Self.    Past Medical History:   There is no problem list on file for this patient.    No past medical history on file.    Allergy History:     Allergies   Allergen Reactions     Amoxicillin      PN: LW Reaction: HIVES       Social History:     reports that he has never smoked. He has never used smokeless tobacco. He reports that he does not drink alcohol or use drugs.      Family History:  Family History   Problem Relation Age of Onset     Cancer Paternal Grandfather      Cancer Paternal Grandmother         lung     Cancer Father         prostate     Myocardial Infarction Maternal Grandfather      Hyperlipidemia Mother        Medications:  Current Outpatient Medications   Medication Sig Dispense Refill     Multiple Vitamin (MULTI-VITAMINS) TABS Take 1 tablet by mouth daily       polyethylene glycol (MIRALAX/GLYCOLAX) packet Take 1 packet by mouth as needed for constipation       zolpidem (AMBIEN) 5 MG tablet Take 1 tablet by mouth as needed             Review of Systems:  -As per HPI  -Constitutional: The patient denies fatigue, fevers, chills, unintended weight loss, and night sweats.  -HEENT: Patient denies nonhealing oral sores.  -Skin: As above in HPI. No additional skin concerns.    Physical exam:  Vitals: There were no vitals taken for this visit.  GEN: This is a well developed, well-nourished male in no acute distress,  in a pleasant mood.    SKIN: Full skin, which includes the head/face, both arms, chest, back, abdomen,both legs, genitalia and/or groin buttocks, digits and/or nails, was examined.  On trunk several cherry angiomas and several light brown, exophytic dermal naevi of about 4mm diameter.   On Dig I left foot a verruca plantaris  No signs for skin cancers    -No other lesions of concern on areas examined.     Impression/Plan:    >> cherry angiomas and dermal naevi    Sun protection and observation      Follow-up in 1-2 years    I spent a total of 10 minutes face to face with Zhang Fajardo during today s office visit. Over 50% of this time was spent counseling the patient and/or coordinating care.  Please see Assessment and Plan for details.      Again, thank you for allowing me to participate in the care of your patient.      Sincerely,    Eb Ortiz MD

## 2019-07-15 NOTE — NURSING NOTE
Dermatology Rooming Note    Zhang Fajardo's goals for this visit include:   Chief Complaint   Patient presents with     Skin Check     Javier is here for skin check mole on his back      Zoë Delong, CMA

## 2019-07-16 LAB — INTERPRETATION ECG - MUSE: NORMAL

## 2019-07-17 LAB
DEPRECATED CALCIDIOL+CALCIFEROL SERPL-MC: 27 UG/L (ref 20–75)
HIV 1+2 AB+HIV1 P24 AG SERPL QL IA: NONREACTIVE

## 2019-07-18 LAB
EXPTIME-PRE: 6.96 SEC
FEF2575-%PRED-PRE: 156 %
FEF2575-PRE: 6.21 L/SEC
FEF2575-PRED: 3.96 L/SEC
FEFMAX-%PRED-PRE: 111 %
FEFMAX-PRE: 11.78 L/SEC
FEFMAX-PRED: 10.61 L/SEC
FEV1-%PRED-PRE: 114 %
FEV1-PRE: 5 L
FEV1FEV6-PRE: 86 %
FEV1FEV6-PRED: 81 %
FEV1FVC-PRE: 86 %
FEV1FVC-PRED: 79 %
FIFMAX-PRE: 9.36 L/SEC
FVC-%PRED-PRE: 104 %
FVC-PRE: 5.82 L
FVC-PRED: 5.59 L

## 2019-11-05 ENCOUNTER — HEALTH MAINTENANCE LETTER (OUTPATIENT)
Age: 49
End: 2019-11-05

## 2020-11-22 ENCOUNTER — HEALTH MAINTENANCE LETTER (OUTPATIENT)
Age: 50
End: 2020-11-22

## 2021-09-19 ENCOUNTER — HEALTH MAINTENANCE LETTER (OUTPATIENT)
Age: 51
End: 2021-09-19

## 2022-01-09 ENCOUNTER — HEALTH MAINTENANCE LETTER (OUTPATIENT)
Age: 52
End: 2022-01-09

## 2022-11-20 ENCOUNTER — HEALTH MAINTENANCE LETTER (OUTPATIENT)
Age: 52
End: 2022-11-20

## 2023-04-15 ENCOUNTER — HEALTH MAINTENANCE LETTER (OUTPATIENT)
Age: 53
End: 2023-04-15